# Patient Record
(demographics unavailable — no encounter records)

---

## 2024-11-04 NOTE — PHYSICAL EXAM
[de-identified] : Neurologic: normal mood and affect, orientated and able to communicate Constitutional: well developed and well nourished  Right Ankle: lateral tenderness and swelling Neurovascularly intact distally

## 2024-11-04 NOTE — HISTORY OF PRESENT ILLNESS
[de-identified] : The patient is a 67 year old [RIGHT/LEFT] hand dominant female who presents today complaining of RT FIBULA FX  Date of Injury/Onset: 10/6/24. Pain:    At Rest: 0/10   With Activity:  3/10   Mechanism of injury:  Patient states she was walking to bathroom without cane and fell.  This is NOT a Work Related Injury being treated under Worker's Compensation.  This is NOT an athletic injury occurring associated with an interscholastic or organized sports team.  Quality of symptoms: sharp   Improves with: n/a Worse with: n/a Prior treatment: [facility] Prior Imaging:  yes Out of work/sport: _, since _ n/a School/Sport/Position/Occupation:_  Additional Information: None

## 2024-11-04 NOTE — HISTORY OF PRESENT ILLNESS
[de-identified] : The patient is a 67 year old [RIGHT/LEFT] hand dominant female who presents today complaining of RT FIBULA FX  Date of Injury/Onset: 10/6/24. Pain:    At Rest: 0/10   With Activity:  3/10   Mechanism of injury:  Patient states she was walking to bathroom without cane and fell.  This is NOT a Work Related Injury being treated under Worker's Compensation.  This is NOT an athletic injury occurring associated with an interscholastic or organized sports team.  Quality of symptoms: sharp   Improves with: n/a Worse with: n/a Prior treatment: [facility] Prior Imaging:  yes Out of work/sport: _, since _ n/a School/Sport/Position/Occupation:_  Additional Information: None patient

## 2024-11-04 NOTE — PHYSICAL EXAM
[de-identified] : Neurologic: normal mood and affect, orientated and able to communicate Constitutional: well developed and well nourished  Right Ankle: lateral tenderness and swelling Neurovascularly intact distally

## 2024-11-04 NOTE — ADDENDUM
[FreeTextEntry1] : Documented by Tr Dumont acting as a scribe for Dr. Delgado and Lemuel Vallejo PA-C on 10/30/2024 and was presence for the following sections: Physical Exam; Data Reviewed; Assessment; Discussion/Summary. All medical record entries made by the Scribe were at my, Dr. Delgado, and Lemuel Vallejo, direction and personally dictated by me on 10/30/2024. I have reviewed the chart and agree that the record accurately reflects my personal performance of the history, physical exam, procedure and imaging.

## 2024-11-04 NOTE — DISCUSSION/SUMMARY
[de-identified] : Reviewed all X Ray images with patient today and interpretation was provided. Patient was given prescription of formal physical therapy for strengthening and stretching. AT HOME Patient provided a CAM boot to wear. Patient advised no weight bearing for a week. Rx shower chair. Patient will follow up with Dr. Shankar in 1 week with repeat x-rays.     ***Lung cancer patient with metastasis to the brain and spine***     ----------------------------------------------- Home Exercise The patient is instructed on a home exercise program.  TERESA DUMONT Acting as a Scribe for Dr. Danny EDDY, Teresa Dumont, attest that this documentation has been prepared under the direction and in the presence of Provider Dr. Delgado.  Activity Modification The patient was advised to modify their activities.  Dx / Natural History The patient was advised of the diagnosis. The natural history of the pathology was explained in full to the patient in layman's terms. Several different treatment options were discussed and explained in full to the patient including the risks and benefits of both surgical and non-surgical treatments.  All questions and concerns were answered.  Pain Guide Activities The patient was advised to let pain guide the gradual advancement of activities.  MAXWELL EDDY explained to the patient that rest, ice, compression, and elevation would benefit them. They may return to activity after follow-up or when they no longer have any pain.  The patient's current medication management of their orthopedic diagnosis was reviewed today: (1) We discussed a comprehensive treatment plan that included possible pharmaceutical management involving the use of prescription strength medications including but not limited to options such as oral Naprosyn 500mg BID, once daily Meloxicam 15 mg, or 500-650 mg Tylenol versus over the counter oral medications and topical prescription NSAID Pennsaid vs over the counter Voltaren gel. (2) There is a moderate risk of morbidity with further treatment, especially from use of prescription strength medications and possible side effects of these medications which include upset stomach with oral medications, skin reactions to topical medications and cardiac/renal issues with long term use. (3) I recommended that the patient follow-up with their medical physician to discuss any significant specific potential issues with long term medication use such as interactions with current medications or with exacerbation of underlying medical comorbidities. (4) The benefits and risks associated with use of injectable, oral or topical, prescription and over the counter anti-inflammatory medications were discussed with the patient. The patient voiced understanding of the risks including but not limited to bleeding, stroke, kidney dysfunction, heart disease, and were referred to the black box warning label for further information.

## 2024-11-04 NOTE — DISCUSSION/SUMMARY
[de-identified] : Reviewed all X Ray images with patient today and interpretation was provided. Patient was given prescription of formal physical therapy for strengthening and stretching. AT HOME Patient provided a CAM boot to wear. Patient advised no weight bearing for a week. Rx shower chair. Patient will follow up with Dr. Shankar in 1 week with repeat x-rays.     ***Lung cancer patient with metastasis to the brain and spine***     ----------------------------------------------- Home Exercise The patient is instructed on a home exercise program.  TERESA DUMONT Acting as a Scribe for Dr. Danny EDDY, Teresa Dumont, attest that this documentation has been prepared under the direction and in the presence of Provider Dr. Delgado.  Activity Modification The patient was advised to modify their activities.  Dx / Natural History The patient was advised of the diagnosis. The natural history of the pathology was explained in full to the patient in layman's terms. Several different treatment options were discussed and explained in full to the patient including the risks and benefits of both surgical and non-surgical treatments.  All questions and concerns were answered.  Pain Guide Activities The patient was advised to let pain guide the gradual advancement of activities.  MAXWELL EDDY explained to the patient that rest, ice, compression, and elevation would benefit them. They may return to activity after follow-up or when they no longer have any pain.  The patient's current medication management of their orthopedic diagnosis was reviewed today: (1) We discussed a comprehensive treatment plan that included possible pharmaceutical management involving the use of prescription strength medications including but not limited to options such as oral Naprosyn 500mg BID, once daily Meloxicam 15 mg, or 500-650 mg Tylenol versus over the counter oral medications and topical prescription NSAID Pennsaid vs over the counter Voltaren gel. (2) There is a moderate risk of morbidity with further treatment, especially from use of prescription strength medications and possible side effects of these medications which include upset stomach with oral medications, skin reactions to topical medications and cardiac/renal issues with long term use. (3) I recommended that the patient follow-up with their medical physician to discuss any significant specific potential issues with long term medication use such as interactions with current medications or with exacerbation of underlying medical comorbidities. (4) The benefits and risks associated with use of injectable, oral or topical, prescription and over the counter anti-inflammatory medications were discussed with the patient. The patient voiced understanding of the risks including but not limited to bleeding, stroke, kidney dysfunction, heart disease, and were referred to the black box warning label for further information.

## 2024-11-04 NOTE — DATA REVIEWED
[FreeTextEntry1] : 10/30/24 OC X-RAY Right Fibula 3 views: This scan was reviewed and interpreted by Dr. Delgado, and his findings are- oblique distal fibula fracture minimally displaced

## 2024-11-06 NOTE — HISTORY OF PRESENT ILLNESS
[Sudden] : sudden [Dull/Aching] : dull/aching [Throbbing] : throbbing [Intermittent] : intermittent [Household chores] : household chores [Leisure] : leisure [Social interactions] : social interactions [Rest] : rest [7] : 7 [Sharp] : sharp [Disabled] : Work status: disabled [de-identified] : Patient here for right ankle. Patient states she was walking to bathroom without cane and fell on 10/6/2024. Patient was initially seen at [facility] and was admitted to hospital and then went to rehab.  Patient was discharged from rehab last week and was referred by Dr. Delgado for further evaluation.  Patient states pain is aching and sharp in the lateral aspect of the ankle. Patient came into office NWB in wheelchair wearing cam boot.  Patient's pmhx significant for lung cancer and brain cancer.  Patient with generalized fatigue and dizziness.   [] : Post Surgical Visit: no [FreeTextEntry1] : Right ankle [FreeTextEntry3] : 10/6/2024 [FreeTextEntry5] : Patient states she was walking to bathroom without cane and fell [de-identified] : Movement

## 2024-11-06 NOTE — ASSESSMENT
[FreeTextEntry1] : 67yoF with minimally displaced lateral malleolus fracture on 10/6/24 and has been treated nonsurgically initially in a splint from outside hospital.  Patient spent last month in SNF.  Patient referred from Dr. Delgado last week after xrays showed minimally displaced lateral malleolus fracture for further management.  Recommend nonsurgical management at this time.  -wbat RLE in [place] -rx for PT -no strenuous activities -Rest, ice, compression, elevation, NSAIDs PRN for pain.  -All questions answered -F/u 2 months with repeat xrays of right ankle 3 views.  The diagnosis was explained in detail. The potential non-surgical and surgical treatments were reviewed. The relative risks and benefits of each option were considered relative to the patients age, activity level, medical history, symptom severity and previously attempted treatments.  The patient was advised to consult with their primary medical provider prior to initiation of any new medications to reduce the risk of adverse effects specific to their long-term home medications and medical history. The risk of gastrointestinal irritation and kidney injury specific to long-term NSAID use was discussed.

## 2024-11-06 NOTE — PHYSICAL EXAM
[de-identified] : Examination of the right foot and ankle is as follows: Inspection: swelling, ecchymosis of foot and ankle, moderate swelling of dorsal foot and lateral ankle, but no abrasion, laceration, no erythema Palpation: lateral malleolus tenderness ROM: plantarflexion 20 degrees, inversion 15 degrees, eversion 10 degrees, dorsiflexion 10 degrees Strength: dorsiflexion 4/5, plantar flexion 4/5, inversion 4/5, eversion 4/5, EHL 5/5, FHL 5/5 Vascular: dorsalis pedis pulse: 2+, posterior tibialis pulse: 2+ Neuro: Sensation present to light touch in all distributions Gait: NWB RLE in wheelchair   X-rays of the right ankle is as follows: Ankle 3 view AP/Lateral/Oblique: minimally displaced, lateral malleolus fracture at the level of the syndesmosis, no medial clear space widening, stress view negative for medial clear space widening

## 2025-01-08 NOTE — HISTORY OF PRESENT ILLNESS
[Sudden] : sudden [Household chores] : household chores [Leisure] : leisure [Social interactions] : social interactions [Rest] : rest [Disabled] : Work status: disabled [0] : 0 [de-identified] : Patient here for right ankle. Patient being treated for minimally displaced lateral malleolus fracture. Patient was getting in home PT 2x a week. Patient states she has no pain at this time in her ankle. Patient came into office NWB in cam boot and wheelchair.  FX coded 11/6/24   [] : Post Surgical Visit: no [FreeTextEntry1] : Right ankle [FreeTextEntry3] : 10/6/2024 [FreeTextEntry5] : Patient states she was walking to bathroom without cane and fell

## 2025-01-08 NOTE — ASSESSMENT
[FreeTextEntry1] : 66 yo female presenting today for f/u of minimally displaced lateral malleolus fracture on 10/6/24, coded on 11/6/24, treated non-surgically in short cam boot. X-rays today demonstrating increased callous formation, fracture is fully healed. Recommend nonsurgical management at this time. -RLE WBAT in [place], may ween off and d/c [place]  -Rx for PT renewed today -Activities as tolerated, no restrictions -Rest, ice, compression, elevation, NSAIDs PRN for pain.  -All questions answered -F/u PRN  The diagnosis was explained in detail. The potential non-surgical and surgical treatments were reviewed. The relative risks and benefits of each option were considered relative to the patients age, activity level, medical history, symptom severity and previously attempted treatments.  The patient was advised to consult with their primary medical provider prior to initiation of any new medications to reduce the risk of adverse effects specific to their long-term home medications and medical history. The risk of gastrointestinal irritation and kidney injury specific to long-term NSAID use was discussed.   Entered by Jaden Orourke PA-C acting as scribe. Dr. Shankar Attestation The documentation recorded by the scribe, in my presence, accurately reflects the service I, Dr. Shankar, personally performed, and the decisions made by me with my edits as appropriate.

## 2025-01-08 NOTE — PHYSICAL EXAM
[de-identified] : Examination of the right foot and ankle is as follows: Inspection: swelling, mild swelling of dorsal foot and lateral ankle, but no abrasion, laceration, no erythema, no ecchymosis Palpation: no lateral malleolus tenderness ROM: plantarflexion 30 degrees, inversion 25 degrees, eversion 15 degrees, dorsiflexion 15 degrees Strength: dorsiflexion 4/5, plantar flexion 4/5, inversion 4/5, eversion 4/5, EHL 5/5, FHL 5/5 Vascular: dorsalis pedis pulse: 2+, posterior tibialis pulse: 2+ Neuro: Sensation present to light touch in all distributions Gait: RLE short cam boot, patient uses wheelchair   X-rays of the right ankle is as follows: Ankle 3 view AP/Lateral/Oblique: minimally displaced, lateral malleolus fracture at the level of the syndesmosis with increased callous formation, fracture is fully healed, no medial clear space widening,

## 2025-05-28 NOTE — ASSESSMENT
[FreeTextEntry1] : 66 yo female presenting today with right ankle ATFL sprain. Recommend non-surgical management -RLE WBAT in lace up ankle support brace, rx given today -Activities as tolerated -Rest, ice, compression, elevation, NSAIDs PRN for pain. -All questions answered -F/u prn   The diagnosis was explained in detail. The potential non-surgical and surgical treatments were reviewed. The relative risks and benefits of each option were considered relative to the patients age, activity level, medical history, symptom severity and previously attempted treatments.   The patient was advised to consult with their primary medical provider prior to initiation of any new medications to reduce the risk of adverse effects specific to their long-term home medications and medical history.   The patient's current medications management of their orthopedic diagnosis was reviewed today:   1) We discussed a comprehensive treatment plan that included possible pharmaceutical management involving the use of prescription strength medications including but not limited to options as oral Naprosyn 500mg BID, once daily Meloxicam 15 mg, or 500-650 mg Tylenol versus over-the-counter oral medications and topical prescriptions NSAID Pennsaid vs over the counter Voltaren gel.  2) There is a moderate risk of morbidity with further treatment, especially from use of prescription strength medications and possible side effects of these medications which include upset stomach with oral medications, skin reactions to topical medications and cardiac/renal issues with long term use.  3) I recommended that hte patient follow-up with their medical physician to discuss any significant specific potential issues with long term medication use such as interactions with current medications or with exacerbation of underlying medical comorbidities.  4) The benefits and risks associated with use of injectable, oral or topical, prescription and over the counter anti-inflammatory medications were discussed with the patient. The patient voiced understanding of the risks including but not limited to bleeding, stroke, kidney dysfunction, heart disease, and were referred to the black box warning label for further information  Entered by Jaden Oroukre PA-C acting as scribe. Dr. Shankar Attestation The documentation recorded by the scribe, in my presence, accurately reflects the service I, Dr. Shankar, personally performed, and the decisions made by me with my edits as appropriate.

## 2025-05-28 NOTE — PHYSICAL EXAM
[de-identified] : Examination of the right foot and ankle is as follows: INSPECTION: swelling, ecchymosis of foot and ankle, moderate swelling of dorsal foot and lateral ankle, but no abrasion, laceration, no erythema PALPATION: lateral ligament tenderness, anterior ankle joint line pain ROM: plantarflexion 20 degrees, inversion 15 degrees, eversion 10 degrees, dorsiflexion 10 degrees STRENGTH: dorsiflexion 4/5, plantar flexion 4/5, inversion 4/5, eversion 4/5, EHL 5/5, FHL 5/5 VASCULAR: dorsalis pedis pulse: 2+, posterior tibialis pulse: 2+ NEURO: Sensation present to light touch in all distributions GAIT: patient uses wheelchair   X-rays of the right ankle is as follows: Ankle 3 view AP/Lateral/Oblique:

## 2025-05-28 NOTE — HISTORY OF PRESENT ILLNESS
[7] : 7 [5] : 5 [Dull/Aching] : dull/aching [Sharp] : sharp [Tightness] : tightness [Constant] : constant [Household chores] : household chores [Leisure] : leisure [Sleep] : sleep [Social interactions] : social interactions [Rest] : rest [Meds] : meds [Standing] : standing [Walking] : walking [Stairs] : stairs [de-identified] : Patient here today for right ankle pain. Patient reports to having 3 falls since 03/25. Denies any prior treatment for the ankle. Experiencing aching and tight pain, taking Tylenol PRN. Comes into the office ambulating in a wheelchair, having difficulty with weight bearing.  [] : Post Surgical Visit: no [FreeTextEntry3] : 03/25 [FreeTextEntry5] : Has had a couple falls  [de-identified] : 05/27/25